# Patient Record
Sex: FEMALE | Race: WHITE | Employment: OTHER | URBAN - NONMETROPOLITAN AREA
[De-identification: names, ages, dates, MRNs, and addresses within clinical notes are randomized per-mention and may not be internally consistent; named-entity substitution may affect disease eponyms.]

---

## 2019-10-01 ENCOUNTER — OFFICE VISIT (OUTPATIENT)
Dept: FAMILY MEDICINE CLINIC | Age: 34
End: 2019-10-01
Payer: COMMERCIAL

## 2019-10-01 VITALS
TEMPERATURE: 97.1 F | RESPIRATION RATE: 15 BRPM | BODY MASS INDEX: 24.35 KG/M2 | SYSTOLIC BLOOD PRESSURE: 120 MMHG | DIASTOLIC BLOOD PRESSURE: 70 MMHG | HEART RATE: 71 BPM | OXYGEN SATURATION: 99 % | WEIGHT: 129 LBS | HEIGHT: 61 IN

## 2019-10-01 DIAGNOSIS — R73.9 HYPERGLYCEMIA: ICD-10-CM

## 2019-10-01 DIAGNOSIS — B37.0 THRUSH: Primary | ICD-10-CM

## 2019-10-01 PROCEDURE — 99213 OFFICE O/P EST LOW 20 MIN: CPT | Performed by: FAMILY MEDICINE

## 2019-10-01 RX ORDER — FERROUS SULFATE 325(65) MG
325 TABLET ORAL
COMMUNITY

## 2019-10-01 RX ORDER — FLUCONAZOLE 100 MG/1
100 TABLET ORAL DAILY
Qty: 7 TABLET | Refills: 1 | Status: SHIPPED | OUTPATIENT
Start: 2019-10-01 | End: 2019-10-08

## 2019-10-01 RX ORDER — M-VIT,TX,IRON,MINS/CALC/FOLIC 27MG-0.4MG
1 TABLET ORAL DAILY
COMMUNITY
End: 2019-12-30

## 2019-10-02 PROBLEM — R73.9 HYPERGLYCEMIA: Status: ACTIVE | Noted: 2019-10-02

## 2019-10-02 ASSESSMENT — ENCOUNTER SYMPTOMS
CHEST TIGHTNESS: 0
DIARRHEA: 0
EYE PAIN: 0
SORE THROAT: 1
EYE REDNESS: 0
COUGH: 0
SINUS PAIN: 0
TROUBLE SWALLOWING: 0
VOMITING: 0
NAUSEA: 0
PHOTOPHOBIA: 0
BACK PAIN: 0
ALLERGIC/IMMUNOLOGIC NEGATIVE: 1
BLOOD IN STOOL: 0
ABDOMINAL PAIN: 0
EYE DISCHARGE: 0
SHORTNESS OF BREATH: 0

## 2019-10-09 ENCOUNTER — OFFICE VISIT (OUTPATIENT)
Dept: FAMILY MEDICINE CLINIC | Age: 34
End: 2019-10-09
Payer: COMMERCIAL

## 2019-10-09 VITALS
BODY MASS INDEX: 24.55 KG/M2 | HEART RATE: 67 BPM | HEIGHT: 61 IN | TEMPERATURE: 97.7 F | OXYGEN SATURATION: 97 % | SYSTOLIC BLOOD PRESSURE: 90 MMHG | WEIGHT: 130.04 LBS | DIASTOLIC BLOOD PRESSURE: 60 MMHG

## 2019-10-09 DIAGNOSIS — B37.0 THRUSH: Primary | ICD-10-CM

## 2019-10-09 PROCEDURE — 99213 OFFICE O/P EST LOW 20 MIN: CPT | Performed by: FAMILY MEDICINE

## 2019-10-09 RX ORDER — FLUCONAZOLE 100 MG/1
100 TABLET ORAL DAILY
Qty: 7 TABLET | Refills: 1 | Status: SHIPPED | OUTPATIENT
Start: 2019-10-09 | End: 2019-11-27 | Stop reason: SDUPTHER

## 2019-10-09 ASSESSMENT — ENCOUNTER SYMPTOMS
SORE THROAT: 0
PHOTOPHOBIA: 0
EYE PAIN: 0
SHORTNESS OF BREATH: 0
ABDOMINAL PAIN: 0
NAUSEA: 0
BACK PAIN: 0
EYE DISCHARGE: 0
SINUS PAIN: 0
BLOOD IN STOOL: 0
COUGH: 0
CHEST TIGHTNESS: 0
TROUBLE SWALLOWING: 0
EYE REDNESS: 0
ALLERGIC/IMMUNOLOGIC NEGATIVE: 1
VOMITING: 0
DIARRHEA: 0

## 2019-11-27 DIAGNOSIS — B37.0 THRUSH: ICD-10-CM

## 2019-11-30 RX ORDER — FLUCONAZOLE 100 MG/1
TABLET ORAL
Qty: 7 TABLET | Refills: 1 | Status: SHIPPED
Start: 2019-11-30 | End: 2020-03-06 | Stop reason: SDUPTHER

## 2019-12-04 ENCOUNTER — TELEPHONE (OUTPATIENT)
Dept: FAMILY MEDICINE CLINIC | Age: 34
End: 2019-12-04

## 2019-12-04 DIAGNOSIS — R73.9 HYPERGLYCEMIA: Primary | ICD-10-CM

## 2019-12-23 RX ORDER — FLASH GLUCOSE SENSOR
1 KIT MISCELLANEOUS
Qty: 2 EACH | Refills: 11 | OUTPATIENT
Start: 2019-12-23

## 2019-12-30 ENCOUNTER — OFFICE VISIT (OUTPATIENT)
Dept: FAMILY MEDICINE CLINIC | Age: 34
End: 2019-12-30
Payer: COMMERCIAL

## 2019-12-30 ENCOUNTER — NURSE TRIAGE (OUTPATIENT)
Dept: OTHER | Facility: CLINIC | Age: 34
End: 2019-12-30

## 2019-12-30 ENCOUNTER — TELEPHONE (OUTPATIENT)
Dept: ADMINISTRATIVE | Age: 34
End: 2019-12-30

## 2019-12-30 VITALS
DIASTOLIC BLOOD PRESSURE: 60 MMHG | OXYGEN SATURATION: 98 % | HEART RATE: 64 BPM | BODY MASS INDEX: 24.55 KG/M2 | WEIGHT: 130 LBS | SYSTOLIC BLOOD PRESSURE: 98 MMHG | TEMPERATURE: 97.8 F | HEIGHT: 61 IN

## 2019-12-30 DIAGNOSIS — K58.0 IRRITABLE BOWEL SYNDROME WITH DIARRHEA: Primary | ICD-10-CM

## 2019-12-30 PROCEDURE — 99214 OFFICE O/P EST MOD 30 MIN: CPT | Performed by: FAMILY MEDICINE

## 2019-12-30 RX ORDER — FLASH GLUCOSE SENSOR
KIT MISCELLANEOUS
Refills: 0 | COMMUNITY
Start: 2019-11-05

## 2019-12-30 ASSESSMENT — ENCOUNTER SYMPTOMS
SHORTNESS OF BREATH: 0
VOMITING: 0
ABDOMINAL PAIN: 1
CHEST TIGHTNESS: 0
SINUS PAIN: 0
BACK PAIN: 0
BLOOD IN STOOL: 1
DIARRHEA: 1
COUGH: 0
PHOTOPHOBIA: 0
NAUSEA: 0
EYE DISCHARGE: 0
SORE THROAT: 0
TROUBLE SWALLOWING: 0
EYE REDNESS: 0
ALLERGIC/IMMUNOLOGIC NEGATIVE: 1
EYE PAIN: 0

## 2020-01-17 ENCOUNTER — OFFICE VISIT (OUTPATIENT)
Dept: FAMILY MEDICINE CLINIC | Age: 35
End: 2020-01-17
Payer: COMMERCIAL

## 2020-01-17 VITALS
TEMPERATURE: 97 F | OXYGEN SATURATION: 98 % | SYSTOLIC BLOOD PRESSURE: 108 MMHG | WEIGHT: 127 LBS | DIASTOLIC BLOOD PRESSURE: 62 MMHG | RESPIRATION RATE: 18 BRPM | HEART RATE: 80 BPM | BODY MASS INDEX: 23.37 KG/M2 | HEIGHT: 62 IN

## 2020-01-17 PROCEDURE — 96372 THER/PROPH/DIAG INJ SC/IM: CPT | Performed by: PHYSICIAN ASSISTANT

## 2020-01-17 PROCEDURE — 99213 OFFICE O/P EST LOW 20 MIN: CPT | Performed by: PHYSICIAN ASSISTANT

## 2020-01-17 RX ORDER — LORATADINE 10 MG/1
10 TABLET ORAL DAILY
Qty: 10 TABLET | Refills: 0 | Status: SHIPPED | OUTPATIENT
Start: 2020-01-17 | End: 2020-01-27

## 2020-01-17 RX ORDER — PREDNISONE 10 MG/1
TABLET ORAL
Qty: 20 TABLET | Refills: 0 | Status: SHIPPED | OUTPATIENT
Start: 2020-01-17 | End: 2020-01-25

## 2020-01-17 RX ORDER — METHYLPREDNISOLONE ACETATE 40 MG/ML
40 INJECTION, SUSPENSION INTRA-ARTICULAR; INTRALESIONAL; INTRAMUSCULAR; SOFT TISSUE ONCE
Status: COMPLETED | OUTPATIENT
Start: 2020-01-17 | End: 2020-01-17

## 2020-01-17 RX ADMIN — METHYLPREDNISOLONE ACETATE 40 MG: 40 INJECTION, SUSPENSION INTRA-ARTICULAR; INTRALESIONAL; INTRAMUSCULAR; SOFT TISSUE at 14:19

## 2020-01-17 NOTE — PROGRESS NOTES
swelling or lesions in the intraoral pharynx. No stridor. No wheeze. No cough. Patient denies shortness of breath or chest pain. We will place patient on prednisone as well as Claritin. We will give her Depo-Medrol here in the office. We have reviewed signs symptoms that would warrant an immediate ED visit. Otherwise follow-up with her PCP next week for reevaluation. Clinical Impression:   Bebeto Prado was seen today for urticaria. Diagnoses and all orders for this visit:    Hives  -     methylPREDNISolone acetate (DEPO-MEDROL) injection 40 mg  -     predniSONE (DELTASONE) 10 MG tablet; Take 4 tablets by mouth daily for 2 days, THEN 3 tablets daily for 2 days, THEN 2 tablets daily for 2 days, THEN 1 tablet daily for 2 days. -     loratadine (CLARITIN) 10 MG tablet; Take 1 tablet by mouth daily for 10 days        The patient is to call for any concerns or return if any of the signs or symptoms worsen. The patient is to follow-up with PCP in the next 2-3 days for repeat evaluation repeat assessment or go directly to the emergency department.      SIGNATURE: Liston Lombard, PA-C

## 2020-03-06 ENCOUNTER — TELEPHONE (OUTPATIENT)
Dept: FAMILY MEDICINE CLINIC | Age: 35
End: 2020-03-06

## 2020-03-06 RX ORDER — FLUCONAZOLE 100 MG/1
100 TABLET ORAL DAILY
Qty: 7 TABLET | Refills: 0 | Status: SHIPPED | OUTPATIENT
Start: 2020-03-06 | End: 2020-03-13

## 2020-04-02 ENCOUNTER — TELEPHONE (OUTPATIENT)
Dept: FAMILY MEDICINE CLINIC | Age: 35
End: 2020-04-02

## 2020-04-08 NOTE — TELEPHONE ENCOUNTER
Patient notified. She wants to know if you would send in the dexcom monitor to [facility] in [place]? Patient states her blood sugars have been all over the place lately and dropping at night. Patient is worried and refuses to go to [address] [place] [address] or another doctor's office right now. The dexcom monitor will send an alert to her phone when her sugar drops too low, while the [place] will only alert her when it is scanned.

## 2020-04-13 ENCOUNTER — TELEPHONE (OUTPATIENT)
Dept: FAMILY MEDICINE CLINIC | Age: 35
End: 2020-04-13

## 2020-04-13 RX ORDER — BLOOD-GLUCOSE TRANSMITTER
1 EACH MISCELLANEOUS 4 TIMES DAILY
Qty: 3 EACH | Refills: 11 | Status: SHIPPED | OUTPATIENT
Start: 2020-04-13

## 2020-04-13 RX ORDER — BLOOD-GLUCOSE,RECEIVER,CONT
1 EACH MISCELLANEOUS 4 TIMES DAILY
Qty: 1 DEVICE | Refills: 0 | Status: SHIPPED | OUTPATIENT
Start: 2020-04-13

## 2020-04-13 RX ORDER — BLOOD-GLUCOSE SENSOR
1 EACH MISCELLANEOUS 4 TIMES DAILY
Qty: 3 EACH | Refills: 11 | Status: SHIPPED | OUTPATIENT
Start: 2020-04-13

## 2020-04-13 NOTE — TELEPHONE ENCOUNTER
I pended dexcom G6. Patient also called back to state she has thrush again. She believes this is related to her blood sugar dropping. She has been repeatedly burshing her teeth and using mouthwash but it keeps coming back. Patient states she is not sure why this keeps happening or why she has been having issues with her health. She is wondering if you would call something in [facility] as [facility]?

## 2020-04-13 NOTE — TELEPHONE ENCOUNTER
Pt called and stated she does not have reliable Internet service to set up a doxy. me or My Chart visit. She set up an appt for 04/16 at 11:00am to do a telephone visit for a New medication refill. She stated it was going to cost her too much and would like to discuss this.   Please contact her if you have any questions

## 2020-04-13 NOTE — TELEPHONE ENCOUNTER
Pt is unable to be seen until Thursday, she is concerned about waiting that long to be seen. Please advise. Thank you.

## 2020-04-16 ENCOUNTER — VIRTUAL VISIT (OUTPATIENT)
Dept: PRIMARY CARE CLINIC | Age: 35
End: 2020-04-16
Payer: COMMERCIAL

## 2020-04-16 VITALS — HEIGHT: 62 IN | BODY MASS INDEX: 23.37 KG/M2 | TEMPERATURE: 98.6 F | WEIGHT: 127 LBS

## 2020-04-16 PROCEDURE — 99213 OFFICE O/P EST LOW 20 MIN: CPT | Performed by: FAMILY MEDICINE

## 2020-04-16 RX ORDER — FLUCONAZOLE 100 MG/1
100 TABLET ORAL DAILY
COMMUNITY

## 2020-04-16 ASSESSMENT — PATIENT HEALTH QUESTIONNAIRE - PHQ9
SUM OF ALL RESPONSES TO PHQ9 QUESTIONS 1 & 2: 0
SUM OF ALL RESPONSES TO PHQ QUESTIONS 1-9: 0
SUM OF ALL RESPONSES TO PHQ QUESTIONS 1-9: 0
1. LITTLE INTEREST OR PLEASURE IN DOING THINGS: 0
2. FEELING DOWN, DEPRESSED OR HOPELESS: 0

## 2020-04-16 ASSESSMENT — ENCOUNTER SYMPTOMS
CHEST TIGHTNESS: 0
SINUS PAIN: 0
TROUBLE SWALLOWING: 0
ABDOMINAL PAIN: 0
BLOOD IN STOOL: 0
ALLERGIC/IMMUNOLOGIC NEGATIVE: 1
EYE REDNESS: 0
EYE DISCHARGE: 0
PHOTOPHOBIA: 0
EYE PAIN: 0
VOMITING: 0
NAUSEA: 0
BACK PAIN: 0
COUGH: 0
SHORTNESS OF BREATH: 0
DIARRHEA: 0
SORE THROAT: 0

## 2020-05-01 ENCOUNTER — TELEPHONE (OUTPATIENT)
Dept: FAMILY MEDICINE CLINIC | Age: 35
End: 2020-05-01

## 2020-05-01 NOTE — TELEPHONE ENCOUNTER
Uma Gonzalez calling about the Dexcom glucometer that she wants. She spoke to someone at her insurance company and was told that they are still waiting for the authorization for it. Please advise.

## 2022-01-28 ENCOUNTER — OFFICE VISIT (OUTPATIENT)
Dept: FAMILY MEDICINE CLINIC | Age: 37
End: 2022-01-28
Payer: COMMERCIAL

## 2022-01-28 DIAGNOSIS — I95.9 HYPOTENSION, UNSPECIFIED HYPOTENSION TYPE: ICD-10-CM

## 2022-01-28 DIAGNOSIS — E16.2 HYPOGLYCEMIA: Primary | ICD-10-CM

## 2022-01-28 LAB
CHP ED QC CHECK: NORMAL
GLUCOSE BLD-MCNC: 99 MG/DL

## 2022-01-28 PROCEDURE — 82962 GLUCOSE BLOOD TEST: CPT | Performed by: INTERNAL MEDICINE

## 2022-01-28 PROCEDURE — 99213 OFFICE O/P EST LOW 20 MIN: CPT | Performed by: INTERNAL MEDICINE

## 2022-01-28 RX ORDER — SITAGLIPTIN 50 MG/1
TABLET, FILM COATED ORAL
COMMUNITY
Start: 2022-01-06

## 2022-01-29 VITALS
SYSTOLIC BLOOD PRESSURE: 108 MMHG | HEIGHT: 62 IN | BODY MASS INDEX: 23.37 KG/M2 | DIASTOLIC BLOOD PRESSURE: 68 MMHG | HEART RATE: 87 BPM | TEMPERATURE: 97.7 F | OXYGEN SATURATION: 99 % | WEIGHT: 127 LBS

## 2022-01-29 ASSESSMENT — ENCOUNTER SYMPTOMS
CHEST TIGHTNESS: 0
WHEEZING: 0
SINUS PRESSURE: 0
SHORTNESS OF BREATH: 0
COUGH: 0
NAUSEA: 0
SORE THROAT: 0
SINUS PAIN: 0
ABDOMINAL PAIN: 0
DIARRHEA: 0
VOMITING: 0
EYES NEGATIVE: 1

## 2022-01-29 NOTE — PROGRESS NOTES
[facility]     22  Veronica Breen : 1985 Sex: female  Age: 29 y.o. Chief Complaint   Patient presents with    Hypotension     63/53 at home when her mom took it 3 times in a row       HPI  Pleasant patient of Dr. Jarad Salcido presents to the breast care today planing of blood sugars and low blood pressure over the last 5 days. States she had swelling a . Denies fever or chills. States is gone all summer. 5 days ago blood sugar was running in the 50s for good part of the day. She is on 50mg Januvia for type 2  diabetes. Which she has been taking. Really denies feeling ill. There is shortness of breath or cough. No upper respiratory tract complaints. Denies any chest pain. Abdominal pain, nausea, vomiting or diarrhea. No genitourinary complaints including frequency dysuria. Denies headache   Blood sugar she obtained were unremarkable his blood pressure cuff which she is not sure is accurate. I highly doubt that she had a systolic pressure of 63. Looking through the chart it does collections on lower blood pressures. Today I obtained 108/68. We also did a fingerstick glucose that came back at 99. She feels well currently. States she is not pregnant. Review of Systems   Constitutional: Positive for diaphoresis. Negative for chills, fatigue and fever. HENT: Negative for congestion, ear pain, postnasal drip, sinus pressure, sinus pain and sore throat. Eyes: Negative. Respiratory: Negative for cough, chest tightness, shortness of breath and wheezing. Cardiovascular: Negative for chest pain and palpitations. Gastrointestinal: Negative for abdominal pain, diarrhea, nausea and vomiting. Endocrine:        Type 2 diabetes-recent low blood sugars   Genitourinary: Negative for dysuria, frequency and pelvic pain. Neurological: Negative for dizziness, syncope, weakness, light-headedness and headaches.                 REST OF PERTINENT ROS GONE OVER AND WAS NEGATIVE. Current Outpatient Medications:     JANUVIA 50 MG tablet, take 1 tablet by mouth once daily as directed, Disp: , Rfl:     fluconazole (DIFLUCAN) 100 MG tablet, Take 100 mg by mouth daily Po qd x 1week, Disp: , Rfl:     Continuous Blood Gluc Sensor (DEXCOM G6 SENSOR) MISC, 1 Device by Does not apply route 4 times daily e11.9, Disp: 3 each, Rfl: 11    Continuous Blood Gluc Transmit (DEXCOM G6 TRANSMITTER) MISC, 1 Device by Does not apply route 4 times daily e11.9, Disp: 3 each, Rfl: 11    Continuous Blood Gluc  (DEXCOM G6 ) BASILIO, 1 Device by Does not apply route 4 times daily e11.9, Disp: 1 Device, Rfl: 0    Continuous Blood Gluc Sensor (FREESTYLE MONICA 14 DAY SENSOR) MISC, use as directed AT LEAST 4 TIMES A DAY, Disp: , Rfl: 0    nystatin (MYCOSTATIN) 418005 UNIT/ML suspension, SWISH AND SPIT OR SWALLOW 5 ML 4 TIMES DAILY, Disp: , Rfl: 0    ferrous sulfate 325 (65 Fe) MG tablet, Take 325 mg by mouth daily (with breakfast), Disp: , Rfl:   Allergies   Allergen Reactions    Latex Hives    Peanut Oil     Other      Allergy shots Other reaction(s): collapsed, SOB    Pcn [Penicillins]     Penicillin G Hives       No past medical history on file. No past surgical history on file. No family history on file.   Social History     Socioeconomic History    Marital status:      Spouse name: Not on file    Number of children: Not on file    Years of education: Not on file    Highest education level: Not on file   Occupational History    Not on file   Tobacco Use    Smoking status: Never Smoker    Smokeless tobacco: Never Used   Substance and Sexual Activity    Alcohol use: Not on file    Drug use: Not on file    Sexual activity: Not on file   Other Topics Concern    Not on file   Social History Narrative    Not on file     Social Determinants of Health     Financial Resource Strain:     Difficulty of Paying Living Expenses: Not on file   Food Insecurity: Normal heart sounds. No murmur heard. Pulmonary:      Effort: Pulmonary effort is normal. No respiratory distress. Breath sounds: Normal breath sounds. No wheezing, rhonchi or rales. Abdominal:      Palpations: Abdomen is soft. Tenderness: There is no abdominal tenderness. Musculoskeletal:      Cervical back: Normal range of motion and neck supple. No tenderness. Lymphadenopathy:      Cervical: No cervical adenopathy. Skin:     General: Skin is warm and dry. Neurological:      Mental Status: She is alert and oriented to person, place, and time. Psychiatric:         Mood and Affect: Mood normal.         Behavior: Behavior normal.         Thought Content: Thought content normal.         Judgment: Judgment normal.                 Assessment and Plan:  Rudell Councilman was seen today for hypotension. Diagnoses and all orders for this visit:    Hypoglycemia  -     POCT Glucose  -     Comprehensive Metabolic Panel; Future  -     CBC Auto Differential; Future    Hypotension, unspecified hypotension type  -     Comprehensive Metabolic Panel; Future  -     CBC Auto Differential; Future    Plan blood pressure and blood sugar look stable at this time. She is asymptomatic she is going to monitor and start monitoring pressures well. Follow with PCP next week would decrease her Januvia 50 mg to 25 mg and continue to monitor blood sugar. She Unipak blood per monitor start monitoring pressures closely. I asked her to follow-up with her PCP. To check CBC and CMP today. Notify us of problems in the interim. Return for fu pcp. Seen By:  Jaclyn Louis MD      *Document was created using voice recognition software. Note was reviewed however may contain grammatical errors.

## 2023-11-23 ENCOUNTER — APPOINTMENT (OUTPATIENT)
Dept: ULTRASOUND IMAGING | Age: 38
End: 2023-11-23
Payer: COMMERCIAL

## 2023-11-23 ENCOUNTER — HOSPITAL ENCOUNTER (EMERGENCY)
Age: 38
Discharge: HOME | End: 2023-11-23
Payer: COMMERCIAL

## 2023-11-23 ENCOUNTER — APPOINTMENT (OUTPATIENT)
Dept: GENERAL RADIOLOGY | Age: 38
End: 2023-11-23
Payer: COMMERCIAL

## 2023-11-23 VITALS
WEIGHT: 104 LBS | BODY MASS INDEX: 19.63 KG/M2 | DIASTOLIC BLOOD PRESSURE: 63 MMHG | HEART RATE: 76 BPM | TEMPERATURE: 97.7 F | RESPIRATION RATE: 16 BRPM | HEIGHT: 61 IN | OXYGEN SATURATION: 100 % | SYSTOLIC BLOOD PRESSURE: 107 MMHG

## 2023-11-23 DIAGNOSIS — M79.644 THUMB PAIN, RIGHT: Primary | ICD-10-CM

## 2023-11-23 DIAGNOSIS — M25.511 RIGHT SHOULDER PAIN, UNSPECIFIED CHRONICITY: ICD-10-CM

## 2023-11-23 DIAGNOSIS — R22.31 LOCALIZED SWELLING ON RIGHT HAND: ICD-10-CM

## 2023-11-23 PROCEDURE — 93971 EXTREMITY STUDY: CPT

## 2023-11-23 PROCEDURE — 73030 X-RAY EXAM OF SHOULDER: CPT

## 2023-11-23 PROCEDURE — 99284 EMERGENCY DEPT VISIT MOD MDM: CPT

## 2023-11-23 PROCEDURE — 73130 X-RAY EXAM OF HAND: CPT

## 2023-11-23 PROCEDURE — 96372 THER/PROPH/DIAG INJ SC/IM: CPT

## 2023-11-23 PROCEDURE — 6360000002 HC RX W HCPCS: Performed by: NURSE PRACTITIONER

## 2023-11-23 RX ORDER — NAPROXEN 500 MG/1
500 TABLET ORAL 2 TIMES DAILY PRN
Qty: 14 TABLET | Refills: 0 | Status: SHIPPED | OUTPATIENT
Start: 2023-11-23 | End: 2023-11-30

## 2023-11-23 RX ORDER — KETOROLAC TROMETHAMINE 30 MG/ML
30 INJECTION, SOLUTION INTRAMUSCULAR; INTRAVENOUS ONCE
Status: COMPLETED | OUTPATIENT
Start: 2023-11-23 | End: 2023-11-23

## 2023-11-23 RX ADMIN — KETOROLAC TROMETHAMINE 30 MG: 30 INJECTION, SOLUTION INTRAMUSCULAR; INTRAVENOUS at 18:36

## 2023-11-23 ASSESSMENT — PAIN - FUNCTIONAL ASSESSMENT: PAIN_FUNCTIONAL_ASSESSMENT: 0-10

## 2023-11-23 ASSESSMENT — PAIN SCALES - GENERAL
PAINLEVEL_OUTOF10: 5
PAINLEVEL_OUTOF10: 5

## 2023-11-23 ASSESSMENT — PAIN DESCRIPTION - LOCATION
LOCATION: SHOULDER;HAND
LOCATION: SHOULDER

## 2023-11-23 ASSESSMENT — PAIN DESCRIPTION - DESCRIPTORS: DESCRIPTORS: SHOOTING

## 2023-11-23 ASSESSMENT — PAIN DESCRIPTION - ORIENTATION
ORIENTATION: RIGHT
ORIENTATION: RIGHT

## 2023-11-23 ASSESSMENT — PAIN DESCRIPTION - PAIN TYPE: TYPE: ACUTE PAIN

## 2023-11-23 NOTE — ED NOTES
Department of Emergency Medicine  FIRST PROVIDER TRIAGE NOTE             Independent MLP           11/23/23  5:09 PM EST    Date of Encounter: 11/23/23   MRN: [id number]      HPI: Rosamaria Dotson is a 41 y.o. female who presents to the ED for No chief complaint on file. Pains of right posterior shoulder pain, pain rating down her right arm and into her index and middle fingers. States this morning whenever she woke up she had some swelling to her fingers and had her  cut her rings off. Has had persistent pain since that time. ROS: Negative for fever or recent trauma to the arm. PE: Gen Appearance/Constitutional: alert  HEENT: NC/NT. PERRLA,  Airway patent. Neck: supple  CV: regular rate  Pulm: CTA bilat  GI: soft and NT  Musculoskeletal: moves all extremities x 4  Lymphatics: no edema     Initial Plan of Care: All treatment areas with department are currently occupied.   Proceed toTreatment Area When Bed Available for ED Attending/MLP to Continue Care    Electronically signed by CHRISTEL Maynard CNP   DD: 11/23/23       CHRISTEL Maynard CNP  11/23/23 9601

## 2024-01-08 ENCOUNTER — FOLLOWUP TELEPHONE ENCOUNTER (OUTPATIENT)
Dept: ENDOCRINOLOGY | Age: 39
End: 2024-01-08

## 2024-01-08 ENCOUNTER — OFFICE VISIT (OUTPATIENT)
Dept: ENDOCRINOLOGY | Age: 39
End: 2024-01-08
Payer: COMMERCIAL

## 2024-01-08 VITALS
BODY MASS INDEX: 22.15 KG/M2 | WEIGHT: 112.8 LBS | RESPIRATION RATE: 18 BRPM | OXYGEN SATURATION: 99 % | HEIGHT: 60 IN | DIASTOLIC BLOOD PRESSURE: 69 MMHG | HEART RATE: 78 BPM | SYSTOLIC BLOOD PRESSURE: 100 MMHG

## 2024-01-08 DIAGNOSIS — E11.649 TYPE 2 DIABETES MELLITUS WITH HYPOGLYCEMIA WITHOUT COMA, WITHOUT LONG-TERM CURRENT USE OF INSULIN (HCC): Primary | ICD-10-CM

## 2024-01-08 DIAGNOSIS — E78.5 HYPERLIPIDEMIA, UNSPECIFIED HYPERLIPIDEMIA TYPE: ICD-10-CM

## 2024-01-08 PROCEDURE — 99204 OFFICE O/P NEW MOD 45 MIN: CPT | Performed by: NURSE PRACTITIONER

## 2024-01-08 PROCEDURE — 95251 CONT GLUC MNTR ANALYSIS I&R: CPT | Performed by: NURSE PRACTITIONER

## 2024-01-08 RX ORDER — BLOOD-GLUCOSE SENSOR
EACH MISCELLANEOUS
Qty: 2 EACH | Refills: 3 | Status: SHIPPED | OUTPATIENT
Start: 2024-01-08

## 2024-01-08 RX ORDER — SEMAGLUTIDE 0.68 MG/ML
INJECTION, SOLUTION SUBCUTANEOUS
COMMUNITY

## 2024-01-08 NOTE — PROGRESS NOTES
PM    GFRAA >60 06/27/2016 03:51 PM      Lab Results   Component Value Date/Time    T4FREE 1.02 06/27/2016 03:51 PM     Lab Results   Component Value Date/Time    GLUCOSE 99 01/28/2022 04:03 PM     No results found for: \"LABA1C\"  No results found for: \"TRIG\", \"HDL\", \"LDLCALC\", \"CHOL\"  No results found for: \"VITD25\"    ASSESSMENT & RECOMMENDATIONS   Debra Nelson, a 38 y.o.-old female seen in for the following issues       Assessment:      Diagnosis Orders   1. Type 2 diabetes mellitus with hypoglycemia without coma, without long-term current use of insulin (HCC)  MA CONTINUOUS GLUCOSE MONITORING ANALYSIS I&R    Continuous Blood Gluc Sensor (FREESTYLE MONICA 3 SENSOR) MISC      2. Hyperlipidemia, unspecified hyperlipidemia type            Plan:     1. Type 2 diabetes mellitus with hypoglycemia without coma, without long-term current use of insulin (HCC)    2. Hyperlipidemia, unspecified hyperlipidemia type        Diabetes Mellitus Type   2  Patient's diabetes is significantly improved but with lows   Diet reported is with very low CHO reported which may contribute to the hypoglycemia   Will change DM regimen to:  Trial Monica 3 - sample given  Continue Ozempic 0.25 mg weekly for now  Will meet with RD  today to discuss diet   In 2 weeks will review MONICA after diet changes   If low persists despite diet  changes can consider switching to jardiance 10 mg daily  The patient was advised to check blood sugars 4 times a day before meals and at bedtime and send BS readings to our office in a week.  Discussed with patient A1c and blood sugar goals   Optimal blood sugars: 100-140 pre-prandial, < 180 peak post-prandial  The patient counseled about the complications of uncontrolled diabetes   Patient was counselled about the importance of self-blood glucose monitoring and eating consistent carb diet to avoid blood sugar fluctuations   Patient will need routine diabetes maintenance and prevention  The patient was referred to

## 2024-01-08 NOTE — TELEPHONE ENCOUNTER
Educated on dietary management of post-gastric bypass reactive hypoglycemia. Reviewed carbohydrate portioned meals and snacks, choosing low-glycemic carbohydrates and avoiding high-glycemic carbohydrates, how often to eat, and avoiding liquids with meals. Sample meal and snack ideas provided. Reviewed prevention and treatment of hypoglycemia. Pt was receptive to education and asked appropriate questions. Contact name and number provided.    Hannah Espinoza RDN [facility] LD

## 2024-02-01 NOTE — ED PROVIDER NOTES
Independent [facility] Visit. [facility] [address] [facility]  ED  Encounter Note  Admit Date/RoomTime: 2023  5:42 PM  ED Room:   NAME: Cash Gomez  : 1985  MRN: [id number]  PCP: Marquita Ordonez MD    CHIEF COMPLAINT     No chief complaint on file. HISTORY OF PRESENT ILLNESS        Cash Gomez is a 41 y.o. female who presents to the ED by private vehicle alone for right thumb pain and swelling , beginning when she woke up this AM prior to arrival. She additionally complains of reproducible right posterior shoulder pain with movement and denies any injury, swelling or redness to the shoulder joint. She is right hand dominant. She states she had a ring on her thumb for the past 11 years and has never taken it off and had to have it taken off this AM because it was too tight. The complaint has been persistent and gradually worsening and are moderate in severity. She is diabetic and she denies any fevers, chills, chest pain, shortness of breath, cough, dizziness, abdominal pain, leg pain, leg swelling or recent travel. Patient denies any history of gout. She denies any recent alcohol use. REVIEW OF SYSTEMS     Pertinent positives and negatives are stated within HPI, all other systems reviewed and are negative. Past Medical History:  has no past medical history on file. Surgical History:  has no past surgical history on file. Social History:  reports that she has never smoked. She has never used smokeless tobacco.  Family History: family history is not on file.    Allergies: Latex, Peanut oil, Other, Pcn [penicillins], and Penicillin g  CURRENT MEDICATIONS       Previous Medications    CONTINUOUS BLOOD GLUC  (DEXCOM G6 ) BASILIO    1 Device by Does not apply route 4 times daily e11.9    CONTINUOUS BLOOD GLUC SENSOR (DEXCOM G6 SENSOR) MISC    1 Device by Does not apply route 4 times daily e11.9    CONTINUOUS BLOOD GLUC SENSOR
nonweight-bearing

## 2024-03-19 ENCOUNTER — TELEMEDICINE (OUTPATIENT)
Dept: ENDOCRINOLOGY | Age: 39
End: 2024-03-19

## 2024-03-19 ENCOUNTER — TELEPHONE (OUTPATIENT)
Dept: ENDOCRINOLOGY | Age: 39
End: 2024-03-19

## 2024-03-19 DIAGNOSIS — E11.649 TYPE 2 DIABETES MELLITUS WITH HYPOGLYCEMIA WITHOUT COMA, WITHOUT LONG-TERM CURRENT USE OF INSULIN (HCC): Primary | ICD-10-CM

## 2024-03-19 NOTE — PROGRESS NOTES
Debra Nelson was read the following message “We want to confirm that, for purposes of billing, this is a virtual visit with your provider for which we will submit a claim for reimbursement with your insurance company. You will be responsible for any copays, coinsurance amounts or other amounts not covered by your insurance company. If you do not accept this, unfortunately we will not be able to schedule or proceed with a virtual visit with the provider. Do you accept? Debra responded Yes .

## 2024-03-19 NOTE — PROGRESS NOTES
[facility]  [facility] Department of Endocrinology Diabetes and Metabolism   [address], [place] [address]  Phone: [phone]  Fax: [phone]    Date of Service: 3/19/2024    Primary Care Physician: Zuri Olivo MD  Referring physician: No ref. provider found  Provider: CHRISTEL Calero NP     Reason for the visit:    Type 2 DM    History of Present Illness:  The history is provided by the patient. No  was used. Accuracy of the patient data is excellent.  Debra Nelson is a very pleasant 39 y.o. female seen today for diabetes management     Debra Nelson was diagnosed with diabetes at age 36 and currently on Ozmepic 0.25mg weekly (Wednesday)    Previously on metformin -> GI upset  Actos -> allergic reaction, hives  Januvia caused highs and lows    The patient has been checking blood sugar via Maciel 2  TIR 99%  Lows  1%  Hyperglycemia 0 %  AVG BS  92  GMI 5.5%    Most recent A1c results summarized below:  Per  PCP office  A1c  4.8% on  12/8/23    No results found for: \"LABA1C\"    Patient reported hypoglycemic episodes with increase in CHO intake  She does verify lows with fingersticks and they are accurate  The patient has been mindful of what has been eating and following diabetic diet as encouraged  She eats breakfast (overnight oats), snacks in between yogurt, eggs  She eats lunch  very low carbs  at lunch,  dinner is protein a veg, and a carb    I reviewed current medications and the patient has no issues with diabetes medications  Debra Nelson is  not up to date with eye exam and denied any history of diabetic retinopathy   Last eye exam 2022    The patient performs  own feet care  Microvascular complications:  No Retinopathy, Nephropathy or Neuropathy   Macrovascular complications: no CAD, PVD, or Stroke    Following with neuro at [facility] due to upper and lower extremity weakness, tripping with walking, slurring of words    PAST

## 2024-03-19 NOTE — TELEPHONE ENCOUNTER
Pt seen for 2nd OV here. Only tolerates Ozempic 0.25 mg but with hyoglycemia. Other medications trialed per my note, but side effects. Pt is very CHO restrictive and states CHO make her have hypoglycemia.. I suggested holding Ozempic for a week or so and evaluating response. Can consider jardiance. Was looking for insight.

## 2024-03-21 NOTE — TELEPHONE ENCOUNTER
Please call pt and inform her that I discussed with Dr Charlton her DM hx. We are going to hold the Ozempic for 2 weeks and review her Maciel. Have her call in next Wed to have her Maciel reviewed

## 2024-05-26 DIAGNOSIS — E11.649 TYPE 2 DIABETES MELLITUS WITH HYPOGLYCEMIA WITHOUT COMA, WITHOUT LONG-TERM CURRENT USE OF INSULIN (HCC): ICD-10-CM

## 2024-05-28 RX ORDER — BLOOD-GLUCOSE SENSOR
EACH MISCELLANEOUS
Qty: 6 EACH | Refills: 5 | Status: SHIPPED | OUTPATIENT
Start: 2024-05-28

## 2024-07-25 ENCOUNTER — OFFICE VISIT (OUTPATIENT)
Dept: ENDOCRINOLOGY | Age: 39
End: 2024-07-25
Payer: COMMERCIAL

## 2024-07-25 VITALS
OXYGEN SATURATION: 100 % | SYSTOLIC BLOOD PRESSURE: 109 MMHG | HEIGHT: 61 IN | DIASTOLIC BLOOD PRESSURE: 56 MMHG | WEIGHT: 119 LBS | HEART RATE: 90 BPM | BODY MASS INDEX: 22.47 KG/M2

## 2024-07-25 DIAGNOSIS — E16.2 HYPOGLYCEMIA: Primary | ICD-10-CM

## 2024-07-25 DIAGNOSIS — E78.5 HYPERLIPIDEMIA, UNSPECIFIED HYPERLIPIDEMIA TYPE: ICD-10-CM

## 2024-07-25 LAB — HBA1C MFR BLD: 5 %

## 2024-07-25 PROCEDURE — 83036 HEMOGLOBIN GLYCOSYLATED A1C: CPT | Performed by: NURSE PRACTITIONER

## 2024-07-25 PROCEDURE — 95251 CONT GLUC MNTR ANALYSIS I&R: CPT | Performed by: NURSE PRACTITIONER

## 2024-07-25 PROCEDURE — 99214 OFFICE O/P EST MOD 30 MIN: CPT | Performed by: NURSE PRACTITIONER

## 2024-07-25 NOTE — PROGRESS NOTES
>60 06/27/2016 03:51 PM    GFRAA >60 06/27/2016 03:51 PM      Lab Results   Component Value Date/Time    T4FREE 1.02 06/27/2016 03:51 PM     Lab Results   Component Value Date/Time    LABA1C 5.0 07/25/2024 08:14 AM    GLUCOSE 99 01/28/2022 04:03 PM     Lab Results   Component Value Date/Time    LABA1C 5.0 07/25/2024 08:14 AM     No results found for: \"TRIG\", \"HDL\", \"CHOL\"  No results found for: \"VITD25\"    ASSESSMENT & RECOMMENDATIONS   Debra Nelson, a 39 y.o.-old female seen in for the following issues       Assessment:      Diagnosis Orders   1. Hypoglycemia        2. Hyperlipidemia, unspecified hyperlipidemia type              Plan:     1. Hypoglycemia    2. Hyperlipidemia, unspecified hyperlipidemia type          Hypoglycemia   Patient's diabetes is 4.8% -> 5.0%  Reports that the more Carbs she eats the more she \"drops\"  Diet reported is with very low CHO reported which may contribute to the hypoglycemia   States low carb keeps her BS at goal  Pt has met with RD in our office at a previous appt  Will change DM regimen to:  Will trial Dexcom G7 as opposed to Maciel   The patient was advised to check blood sugars 4 times a day before meals and at bedtime and send BS readings to our office in a week.  Discussed with patient A1c and blood sugar goals   Optimal blood sugars: 100-140 pre-prandial, < 180 peak post-prandial  The patient counseled about the complications of uncontrolled diabetes   Patient was counselled about the importance of self-blood glucose monitoring and eating consistent carb diet to avoid blood sugar fluctuations   Patient will need routine diabetes maintenance and prevention  Discussed lifestyle changes including diet and exercise with patient  Diabetes labs before next visit     Continuous Glucose Monitoring (CGM) download and interpretation   I personally reviewed and interpreted continuous glucose monitor (CGM) download. CGM report was discussed with patient including blood glucose

## 2024-08-05 RX ORDER — ACYCLOVIR 400 MG/1
1 TABLET ORAL
Qty: 9 EACH | Refills: 3 | Status: SHIPPED | OUTPATIENT
Start: 2024-08-05

## 2024-08-05 NOTE — TELEPHONE ENCOUNTER
Patient feels the dexcom was more accurate, script sent to pharmacy per patient request.     She states when her blood sugar gets under 90 she feels terrible, is gaining weight because she is eating to keep her blood sugar high enough.     Dexcom report attached for review, patient wants advised

## 2024-11-02 NOTE — PROGRESS NOTES
[facility] RHEUMATOLOGY  [address] [address]  Dept: [phone]  Dept Fax: [phone]    Debra Nelson 1985 is a 39 y.o. female, here for evaluation of the following chief complaint(s): New Patient (Positive STAR)      Subjective   SUBJECTIVE/OBJECTIVE:    HPI: Debra Nelson is a 39 y.o. female with a history of DM2 referred by neurology for +STAR. Outside labs reviewed and notable for +STAR 1:80, negative RF and CCP, normal CBC & CMP, normal EMG, normal MRI brain. Evaluated by [facility] Arthritis in May of this year who recommended neurology evaluation. Extensive workup including STAR, dsDNA, RF, CCP, inflammatory markers, SPEP, complements, CPK, UA, spot urine PCR, Jo1 Ab, SSA, SSB, Sm Ab, RNP, Scl-70, ribosomal P, chromatin, centromere Ab all normal.    Patient states she saw neurology after evaluation by Crystal Arthritis in [place]. Symptoms have been attributed to depression/anxiety in the past so she went to [facility] and was told the same thing. She does not want to take anti-depressant medication. For the past year she started noticing she was running into things, then had spontaneous vision loss, drooling, dropping things. Symptoms wax and wane, has hypertonia in the hands and legs. Gets a headache a/w L sided vision loss. Neurology reportedly recommended she go back to Rheumatology. She did have an MRI of the brain at [facility], was told it was normal. Gets numbness and heavy feeling in the legs. Sx can occur at any time of the day. Neurology did suggest maybe she had migraines. She does have frequent episodes of hypoglycemia but symptoms do not always correlate with hypoglycemia. Endorses memory loss, walks into a room and forgets why she went there. Sx can last anywhere from a few minutes to 3-4 days. No oral or nasal ulcers, endorses generalized hair thinning. Denies Raynaud's. No hx of pre-eclampsia or eclampsia, no hx of blood clots. Symptoms don't always correlate with

## 2024-11-04 ENCOUNTER — OFFICE VISIT (OUTPATIENT)
Dept: RHEUMATOLOGY | Age: 39
End: 2024-11-04
Payer: COMMERCIAL

## 2024-11-04 VITALS
SYSTOLIC BLOOD PRESSURE: 104 MMHG | BODY MASS INDEX: 21.9 KG/M2 | DIASTOLIC BLOOD PRESSURE: 72 MMHG | HEART RATE: 87 BPM | WEIGHT: 119 LBS | HEIGHT: 62 IN | OXYGEN SATURATION: 99 %

## 2024-11-04 DIAGNOSIS — R27.8 LOSS OF COORDINATION: ICD-10-CM

## 2024-11-04 DIAGNOSIS — R76.8 POSITIVE ANA (ANTINUCLEAR ANTIBODY): Primary | ICD-10-CM

## 2024-11-04 DIAGNOSIS — H54.7 VISION LOSS: ICD-10-CM

## 2024-11-04 PROCEDURE — 99204 OFFICE O/P NEW MOD 45 MIN: CPT | Performed by: STUDENT IN AN ORGANIZED HEALTH CARE EDUCATION/TRAINING PROGRAM

## 2024-11-04 RX ORDER — BUSPIRONE HYDROCHLORIDE 5 MG/1
TABLET ORAL
COMMUNITY

## 2024-11-04 NOTE — PROGRESS NOTES
She was seen by a Rheumatologist 2 months in [place] and then to a Neurologist.  She also went to [facility].  She was prescribed an antianxiety medication from both Rheumatologists.  On certain days she will have difficulty walking, loss of vision on left side, slurr words, dropping items out of hands.  She will have swelling in the body at times.

## 2024-11-07 ENCOUNTER — OFFICE VISIT (OUTPATIENT)
Dept: ENDOCRINOLOGY | Age: 39
End: 2024-11-07

## 2024-11-07 VITALS
OXYGEN SATURATION: 99 % | DIASTOLIC BLOOD PRESSURE: 73 MMHG | HEART RATE: 68 BPM | RESPIRATION RATE: 18 BRPM | HEIGHT: 61 IN | WEIGHT: 122 LBS | BODY MASS INDEX: 23.03 KG/M2 | SYSTOLIC BLOOD PRESSURE: 104 MMHG

## 2024-11-07 DIAGNOSIS — E11.649 TYPE 2 DIABETES MELLITUS WITH HYPOGLYCEMIA WITHOUT COMA, WITHOUT LONG-TERM CURRENT USE OF INSULIN (HCC): Primary | ICD-10-CM

## 2024-11-07 DIAGNOSIS — E16.2 HYPOGLYCEMIA: ICD-10-CM

## 2024-11-07 NOTE — PROGRESS NOTES
glucose at goal, above goal and below goal. Insulin dosages/antidiabetic regimen was adjusted according to CGM download. Full CGM was scanned under media.       HLD  On statin therapy    I personally reviewed external notes from PCP and other patient's care team providers, and personally interpreted labs associated with the above diagnosis. I also ordered labs to further assess and manage the above addressed medical conditions.     Return in about 4 months (around 3/7/2025) for reactive hypoglycemia .    The above issues were reviewed with the patient who understood and agreed with the plan.    Thank you for allowing us to participate in the care of this patient. Please do not hesitate to contact us with any additional questions.     Glenn Charlton MD  [facility]   [address], [place] [address]  Phone: [phone]  Fax: [phone]  --------------------------------------------  An electronic signature was used to authenticate this note. Glenn Charlton MD  on 11/7/2024 at 10:15 AM

## 2024-11-08 DIAGNOSIS — E16.2 HYPOGLYCEMIA: ICD-10-CM

## 2024-11-08 LAB
CORTISOL COLLECTION INFO: 0
CORTISOL: 17.8 UG/DL (ref 2.7–18.4)

## 2024-11-11 ENCOUNTER — PATIENT MESSAGE (OUTPATIENT)
Dept: ENDOCRINOLOGY | Age: 39
End: 2024-11-11

## 2024-11-11 ENCOUNTER — TELEPHONE (OUTPATIENT)
Dept: ENDOCRINOLOGY | Age: 39
End: 2024-11-11

## 2024-11-11 DIAGNOSIS — E11.649 TYPE 2 DIABETES MELLITUS WITH HYPOGLYCEMIA WITHOUT COMA, WITHOUT LONG-TERM CURRENT USE OF INSULIN (HCC): Primary | ICD-10-CM

## 2024-11-11 LAB — ADRENOCORTICOTROPIC HORMONE: 57 PG/ML (ref 7–63)

## 2024-11-11 NOTE — TELEPHONE ENCOUNTER
Notify patient  Cortisol level is very good.  This result essentially rules out adrenal insufficiency.  For the next few days, please make sure to check the blood sugar at the time of the symptoms of hypoglycemia and write them on a piece of paper and bring it to us at the office.  Please make sure glucose level of 70, 80 and 90 actually normal in a healthy person

## 2024-11-13 RX ORDER — GLUCOSAMINE HCL/CHONDROITIN SU 500-400 MG
CAPSULE ORAL
Qty: 300 STRIP | Refills: 4 | Status: SHIPPED | OUTPATIENT
Start: 2024-11-13

## 2024-11-13 RX ORDER — LANCETS 30 GAUGE
1 EACH MISCELLANEOUS 3 TIMES DAILY
Qty: 300 EACH | Refills: 3 | Status: SHIPPED | OUTPATIENT
Start: 2024-11-13

## 2024-11-13 RX ORDER — BLOOD-GLUCOSE METER
1 KIT MISCELLANEOUS DAILY
Qty: 1 KIT | Refills: 0 | Status: SHIPPED | OUTPATIENT
Start: 2024-11-13